# Patient Record
Sex: FEMALE | Race: ASIAN | Employment: FULL TIME | URBAN - METROPOLITAN AREA
[De-identification: names, ages, dates, MRNs, and addresses within clinical notes are randomized per-mention and may not be internally consistent; named-entity substitution may affect disease eponyms.]

---

## 2020-07-05 ENCOUNTER — HOSPITAL ENCOUNTER (EMERGENCY)
Facility: CLINIC | Age: 25
Discharge: HOME | End: 2020-07-05
Attending: EMERGENCY MEDICINE | Admitting: EMERGENCY MEDICINE
Payer: COMMERCIAL

## 2020-07-05 ENCOUNTER — ANCILLARY PROCEDURE (OUTPATIENT)
Dept: ULTRASOUND IMAGING | Facility: CLINIC | Age: 25
End: 2020-07-05
Attending: EMERGENCY MEDICINE
Payer: COMMERCIAL

## 2020-07-05 VITALS
DIASTOLIC BLOOD PRESSURE: 84 MMHG | SYSTOLIC BLOOD PRESSURE: 121 MMHG | RESPIRATION RATE: 18 BRPM | OXYGEN SATURATION: 97 % | HEIGHT: 59 IN | HEART RATE: 63 BPM | WEIGHT: 120 LBS | BODY MASS INDEX: 24.19 KG/M2 | TEMPERATURE: 98 F

## 2020-07-05 DIAGNOSIS — L03.115 CELLULITIS OF RIGHT LOWER EXTREMITY: ICD-10-CM

## 2020-07-05 PROCEDURE — 99284 EMERGENCY DEPT VISIT MOD MDM: CPT | Mod: 25 | Performed by: EMERGENCY MEDICINE

## 2020-07-05 PROCEDURE — 76882 US LMTD JT/FCL EVL NVASC XTR: CPT | Performed by: EMERGENCY MEDICINE

## 2020-07-05 PROCEDURE — 76882 US LMTD JT/FCL EVL NVASC XTR: CPT | Mod: RT | Performed by: EMERGENCY MEDICINE

## 2020-07-05 RX ORDER — CETIRIZINE HYDROCHLORIDE 10 MG/1
10 TABLET ORAL DAILY
COMMUNITY

## 2020-07-05 RX ORDER — DOXYCYCLINE 100 MG/1
100 CAPSULE ORAL 2 TIMES DAILY
Qty: 10 CAPSULE | Refills: 0 | Status: SHIPPED | OUTPATIENT
Start: 2020-07-05 | End: 2020-07-10

## 2020-07-05 RX ORDER — OXYCODONE HYDROCHLORIDE 5 MG/1
5 TABLET ORAL EVERY 6 HOURS PRN
Qty: 12 TABLET | Refills: 0 | COMMUNITY
Start: 2020-07-05 | End: 2020-07-05

## 2020-07-05 ASSESSMENT — MIFFLIN-ST. JEOR: SCORE: 1204.95

## 2020-07-05 NOTE — ED PROVIDER NOTES
"ED Provider Note  [facility]      History     Chief Complaint   Patient presents with     Leg Pain     HPI  Fatmata Ramirez is a 25 year old female who presents for concern of redness and pain weighted area over her right anterior lateral thigh.  Noticed this earlier today called the nurse line and advised to come to the emergency department.  Experience no identifiable area of trauma or bite.  Concerned that this may be a clot.  No history of prior DVTs.  No history of prior incidences.    Otherwise well no fevers or chills no body aches no other rashes  No known sick contacts or covert exposures    Past Medical History  Past Medical History:   Diagnosis Date     Hearing deficit      Past Surgical History:   Procedure Laterality Date     ENT SURGERY      right eye     ORTHOPEDIC SURGERY      right hand     cetirizine (ZYRTEC) 10 MG tablet  doxycycline hyclate (VIBRAMYCIN) 100 MG capsule      Allergies   Allergen Reactions     Pollen Extract Other (See Comments)     Amoxicillin Other (See Comments)     convulsion     Past medical history, past surgical history, medications, and allergies were reviewed with the patient. Additional pertinent items: None    Family History  No family history on file.  Family history was reviewed with the patient. Additional pertinent items: None    Social History  Social History     Tobacco Use     Smoking status: Never Smoker   Substance Use Topics     Alcohol use: Yes     Comment: occasionally     Drug use: Yes     Types: Marijuana     Comment: last use 2 days ago, occasional      Social history was reviewed with the patient. Additional pertinent items: None    Review of Systems   10 point review of symptoms was performed and is negative except as noted above.       Physical Exam   BP: 121/84  Pulse: 63  Temp: 98  F (36.7  C)  Resp: 18  Height: 149.9 cm (4' 11\")  Weight: 54.4 kg (120 lb)  SpO2: 97 %  Physical Exam  GEN: Well appearing, non toxic, cooperative and " conversant.   HEENT: The head is normocephalic and atraumatic. Pupils are equal round and reactive to light. Extraocular motions are intact. There is no facial swelling.   CV: Regular rate   PULM: Unlabored breathing     EXT: Full range of motion.  No edema.  NEURO: Cranial nerves II through XII are intact and symmetric. Bilateral upper and lower extremities grossly show full range of motion without any focal deficits.   Right thigh with quarter sized area of erythema, macular no identifiable vesicle or furuncle, no ulceration.  Minimally erythematous.  Tender to touch  PSYCH: Calm and cooperative, interactive.     ED Course      Procedures  Results for orders placed during the hospital encounter of 07/05/20   POC US SOFT TISSUE    Impression Limited Soft Tissue Ultrasound, performed and interpreted by me.    Indication:  Skin redness pain. Evaluate for cellulitis vs abscess.     Body location: right lower extremity    Findings:  There is no cobblestoning suggestive of cellulitis in the evaluated area. There is no fluid collection to suggest abscess. No foreign body identified    IMPRESSION: No cellulitis or abscess clearly defined.                    Results for orders placed or performed during the hospital encounter of 07/05/20   POC US SOFT TISSUE     Status: None    Impression    Limited Soft Tissue Ultrasound, performed and interpreted by me.    Indication:  Skin redness pain. Evaluate for cellulitis vs abscess.     Body location: right lower extremity    Findings:  There is no cobblestoning suggestive of cellulitis in the evaluated area. There is no fluid collection to suggest abscess. No foreign body identified    IMPRESSION: No cellulitis or abscess clearly defined.            Medications - No data to display     Assessments & Plan (with Medical Decision Making)   25-year-old female presenting with 1 day of painful erythematous patch on her right thigh.  No history of trauma.  No history of insect bite.   Location and symptomatology not consistent with DVT.  Does not appear to be a purpura.  May be related to cellulitis, early.  No underlying abscess on ultrasound and no clear cobblestoning to suggest severe cellulitis.  Given her presentation, however, would be reasonable to treat for early cellulitis.  Prescribe doxycycline due to allergy.  Refer to primary care for follow-up.  Patient agrees with our plan.  Strict ED return precautions given and discussed    - Patient agrees to our plan and is ready and eager for discharge. Care plan, follow up plan, and reasons to return immediately to the ED were dicussed in detail and summarized as noted in the discharge instructions.      I have reviewed the nursing notes. I have reviewed the findings, diagnosis, plan and need for follow up with the patient.    New Prescriptions    DOXYCYCLINE HYCLATE (VIBRAMYCIN) 100 MG CAPSULE    Take 1 capsule (100 mg) by mouth 2 times daily for 5 days       Final diagnoses:   Cellulitis of right lower extremity       --  Lai Pereira MD   Emergency Medicine   [facility], [facility] EMERGENCY DEPARTMENT  7/5/2020     Lai Pereira MD  07/05/20 0436

## 2020-07-05 NOTE — ED TRIAGE NOTES
Pt arrived to the ER with c/o pain in her right thigh, with redness and tender to touch. VSS and afebrile. Pt is concerned about clot in her leg. No known hx of clot.

## 2020-07-05 NOTE — ED AVS SNAPSHOT
[facility], [place], Emergency Department  [address] [place] [id number]  Phone:  [phone]                                    Fatmata Ramirez   MRN: [id number]    Department:  [facility], Emergency Department   Date of Visit:  7/5/2020           After Visit Summary Signature Page    I have received my discharge instructions, and my questions have been answered. I have discussed any challenges I see with this plan with the nurse or doctor.    ..........................................................................................................................................  Patient/Patient Representative Signature      ..........................................................................................................................................  Patient Representative Print Name and Relationship to Patient    ..................................................               ................................................  Date                                   Time    ..........................................................................................................................................  Reviewed by Signature/Title    ...................................................              ..............................................  Date                                               Time          22EPIC Rev 08/18

## 2020-07-05 NOTE — DISCHARGE INSTRUCTIONS
Please make an appointment to [facility] with [facility] (phone: [phone]) in 3-5 days unless symptoms completely resolve.    Return to the emergency department for any worsening redness, swelling, drainage, pain, fever or any other concerns as given or discussed.

## 2021-08-31 ENCOUNTER — OFFICE VISIT (OUTPATIENT)
Dept: OPTOMETRY | Facility: CLINIC | Age: 26
End: 2021-08-31
Payer: COMMERCIAL

## 2021-08-31 DIAGNOSIS — H53.031 STRABISMIC AMBLYOPIA OF RIGHT EYE: ICD-10-CM

## 2021-08-31 DIAGNOSIS — H52.13 MYOPIA OF BOTH EYES WITH ASTIGMATISM: Primary | ICD-10-CM

## 2021-08-31 DIAGNOSIS — H33.101 RETINOSCHISIS OF RIGHT EYE: ICD-10-CM

## 2021-08-31 DIAGNOSIS — H52.203 MYOPIA OF BOTH EYES WITH ASTIGMATISM: Primary | ICD-10-CM

## 2021-08-31 RX ORDER — ESCITALOPRAM OXALATE 10 MG/1
10 TABLET ORAL DAILY
COMMUNITY
Start: 2021-08-17 | End: 2022-12-05

## 2021-08-31 RX ORDER — TRAZODONE HYDROCHLORIDE 50 MG/1
TABLET, FILM COATED ORAL
COMMUNITY
Start: 2021-08-17 | End: 2022-12-05

## 2021-08-31 ASSESSMENT — REFRACTION_WEARINGRX
OD_AXIS: 075
OD_SPHERE: -1.50
OS_AXIS: 110
OD_CYLINDER: +2.50
OS_CYLINDER: +1.75
OS_SPHERE: -0.25

## 2021-08-31 ASSESSMENT — REFRACTION_CURRENTRX
OS_SPHERE: +1.50
OD_CYLINDER: -1.75
OD_BRAND: BF TORIC TRIAL
OS_AXIS: 010
OD_SPHERE: +1.00
OD_AXIS: 170
OS_BRAND: BF TORIC TRIAL
OS_CYLINDER: -1.75

## 2021-08-31 ASSESSMENT — CUP TO DISC RATIO
OS_RATIO: 0.25
OD_RATIO: 0.25

## 2021-08-31 ASSESSMENT — TONOMETRY
OS_IOP_MMHG: 17
IOP_METHOD: ICARE
OD_IOP_MMHG: 17

## 2021-08-31 ASSESSMENT — REFRACTION_MANIFEST
OS_AXIS: 105
OS_SPHERE: -0.25
OS_CYLINDER: +1.75
OD_SPHERE: -1.50
OD_CYLINDER: +2.75
OD_AXIS: 078

## 2021-08-31 ASSESSMENT — CONF VISUAL FIELD
OD_NORMAL: 1
OS_NORMAL: 1

## 2021-08-31 ASSESSMENT — SLIT LAMP EXAM - LIDS
COMMENTS: NORMAL
COMMENTS: NORMAL

## 2021-08-31 ASSESSMENT — EXTERNAL EXAM - RIGHT EYE: OD_EXAM: NORMAL

## 2021-08-31 ASSESSMENT — VISUAL ACUITY
OS_CC: 20/25-2
OD_CC: 20/30+1
CORRECTION_TYPE: GLASSES
METHOD: SNELLEN - LINEAR

## 2021-08-31 ASSESSMENT — EXTERNAL EXAM - LEFT EYE: OS_EXAM: NORMAL

## 2021-08-31 NOTE — PROGRESS NOTES
A/P  1.) Mixed Astigmatism each eye  -Largely stable spec Rx, updated today  -Previous CL wearer, would like part-time option. 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 Monitor 1 year routine eye exam

## 2021-09-07 ENCOUNTER — TELEPHONE (OUTPATIENT)
Dept: OPTOMETRY | Facility: CLINIC | Age: 26
End: 2021-09-07

## 2021-09-07 NOTE — TELEPHONE ENCOUNTER
----- Message from Es Infante, OD sent at 9/7/2021  8:05 AM CDT -----  Could you let pt know her contact lens trials are just a little bit delayed? I have the left lenses in but the right are due to come in this week. We will still mail when both eyes are here.    Thanks

## 2021-11-18 NOTE — TELEPHONE ENCOUNTER
RECORDS RECEIVED FROM: Right hand/ hx surgeries/ increased pain wrist/ decrease ROM/ self/ HP   DATE RECEIVED: Dec 14, 2021     NOTES STATUS DETAILS   OFFICE NOTE from referring provider Care Everywhere Lisa Graff MD     DISCHARGE REPORT from the ER Care Everywhere Jessica Dueñas PA     OPERATIVE REPORT In process    MEDICATION LIST Internal    XRAYS (IMAGES & REPORTS) In process-R      12.14.21 MELLO  Called [facility]. Stated was only seen the one time in 2016.    12.14.21 MJ  Called [facility]. They open at 8:30 AM [facility].    12.13.21    Received note from 8.11.16 from [facility]. Sent to urgent scanning. Sent fax again asking for ALL related medical records. OV states pt seen back in 2000.      12.13.21 MELLO  Called [phone]. Stated they haven't received anything. Requested for another fax to be sent. Sent stat request to verified fax number at [phone]    12/08/21   10:14 AM   3rd request sent to [facility]. Called facility and was sent in a loop unable to speak to anyone  Jennifer Sadler CMA      12/07/21   9:21 AM   CALLED [facility], IMAGES ARE AT THE [facility] IN [place], FAXED REQUEST [phone]  2ND REQUEST SENT TO KIRK Sadler CMA    11/30/21   11:23 AM   CALLED PATIENT TO SEE WHERE SURGERIES WERE. [facility]. [phone] FAXED REQUEST AND ALSO ASKED PATIENT TO REACH OUT TO FACILITY TO SIGN DOMINIQUE  Jennifer Sadler CMA

## 2021-11-19 ENCOUNTER — THERAPY VISIT (OUTPATIENT)
Dept: OCCUPATIONAL THERAPY | Facility: CLINIC | Age: 26
End: 2021-11-19
Payer: COMMERCIAL

## 2021-11-19 DIAGNOSIS — M65.4 DE QUERVAIN'S DISEASE (TENOSYNOVITIS): ICD-10-CM

## 2021-11-19 PROCEDURE — 97110 THERAPEUTIC EXERCISES: CPT | Mod: GO

## 2021-11-19 PROCEDURE — 97165 OT EVAL LOW COMPLEX 30 MIN: CPT | Mod: GO

## 2021-11-19 PROCEDURE — 97760 ORTHOTIC MGMT&TRAING 1ST ENC: CPT | Mod: GO

## 2021-11-19 NOTE — PROGRESS NOTES
[facility] Initial Evaluation     Current Date: 11/19/2021    Diagnosis: R de Quervain   DOI: 9/1/2021    Subjective:  Answers for HPI/ROS submitted by the patient on 11/19/2021  Reason for Visit:: Dequervains synovitis in my right hand and childhood trauma to my right thumb.  When problem began:: 5/19/1998  How problem occurred:: Overuse due to compensating for limited mobility and surgery complications.  Number scale: 5/10  General health as reported by patient: good  Please check all that apply to your current or past medical history: implanted device, migraines/headaches, numbness/tingling  Medical allergies: other  Other Allergies Detail: Amoxicillin  Surgeries: orthopedic surgery, other  Other Surgery Detail: Eye surgery  Medications you are currently taking: anti-depressants, pain medication, sleep medication  Occupation::  - [employer]  What are your primary job tasks: computer work, driving, prolonged sitting, repetitive tasks    Occupational Profile Information:  Right hand dominant  Prior functional level:  independent-shared household chores  Patient reports symptoms of pain, stiffness/loss of motion, weakness/loss of strength, edema, numbness and tingling   Special tests:  x-ray.    Previous treatment: otc brace, icing, NSAIDS, CBD oil  Barriers include:none  Mobility: No difficulty  Transportation: drives  Leisure activities/hobbies: cooking/baking, play guitar, watching television    Functional Outcome Measure:   Upper Extremity Functional Index Score:  SCORE:   Column Totals: /80: (P) 40   (A lower score indicates greater disability.)    Objective:  Pain Level (Scale 0-10):   11/19/2021   At Rest 4-5/10   With Use 7/10     Pain Description:  Date 11/19/2021   Location Thumb in 1st dc, shooting pain through thumb and sometimes on pinky side stemming from elbow, thenar muscle is sore.    Pain Quality Aching, Burning, Dull, Numb, Sharp, Shooting and Tingling   Frequency constant     Pain  is worst  daytime or nighttime   Exacerbated by  driving, cooking (gripping and stirring), weightbearing, opening jars, and using keys   Relieved by NSAIDs and CBD oil, otc brace   Progression Gradually worsening     Sensation   Decreased Radial Nerve distribution per pt report    Edema   - none  + mild    ++ moderate    +++ severe    11/19/2021   1st DC +   Radial Styloid +      ROM  Pain Report: - none  + mild    ++ moderate    +++ severe   Thumb 11/19/2021 11/19/2021   AROM (PROM) L R   MP 0/50 0/36   IP 0/74 0/77   RABD 44 39   PABD 41 34   Opposition 10 8     Wrist 11/19/2021 11/19/2021   AROM (PROM) L R   Extension 64 63   Flexion 66 51   RD 4 3   UD 31 24   UD with Th Flex 23 10     Resisted Testing  Pain Report: - none  + mild    ++ moderate    +++ severe    11/19/2021   APL 4/5, 6/10   EPB 5/5, 6/10   EPL 4+/5, 5/10   FCR 4+/5, 7/10     Strength   (Measured in pounds)  Pain Report: - none  + mild    ++ moderate    +++ severe    11/19/2021 11/19/2021   Trials L R   1  2  3 54 16 (5/10 pain)   Average 54 16     Lat Pinch 11/19/2021 11/19/2021   Trials L R   1  2  3 19 9 (5/10 pain)   Average 19 9     3 Pt Pinch 11/19/2021 11/19/2021   Trials L R   1  2  3 16 7 (5/10 pain)   Average 16 7     Special Tests   Pain Report:  - none    + mild    ++ moderate    +++ severe    11/19/2021   Finkelsteins + (6/10 pain)   Radial Nerve Tinel's (DRSN) + (8/10 pain)   Th CMC Grind -     Neural Tension Testing  RNT: Radial Neurodynamic Test (based on KADIE Higginbotham's ULNT)   11/19/2021   0-5 Scale 1/5   Position:   0/5: Arm across abdomen in coronal plane  1/5: Depress shoulder, ER to neutral ABD shoulder to 45 degrees  2/5: IR shoulder to end range, keep elbow at 90 degrees  3/5: Extend elbow to 0 degrees  4/5: Fully pronate forearm  5/5: Flex wrist and fingers with UD  Notes:    (+) indicates beyond grade level but less than senior care to next level    (-) indicates over senior care to level    S1  onset/change of patient's  symptoms    S2 definite stop point based on patient's discomfort level    Neural Tension Testing  UNT: Ulnar Neurodynamic Test (based on DS Anahy's ULNT)   11/19/2021   0-5 Scale 1/5   Position:   0/5: Arm across abdomen in coronal plane  1/5: ER to neutral ABD shoulder to 45 degrees  2/5: ER shoulder to 90 degrees  3/5: Block shoulder and ABD shoulder to 110 degrees  4/5: Fully pronate forearm, extend wrist and ring and small fingers  5/5: Flex elbow and bring hand to side of face  Notes:    (+) indicates beyond grade level but less than detention to next level    (-) indicates over detention to level    S1  onset/change of patient's symptoms    S2 definite stop point based on patient's discomfort level    Palpation  Pain Report:  - none    + mild    ++ moderate    +++ severe    11/19/2021   Radial Styloid 8/10   1st DC 6/10   FCR 6/10   Thumb CMC 5-6/10   PIN Site 7/10   Extensor Wad 6/10     Assessment:  Patient presents with symptoms consistent with diagnosis of right de Quervain, with conservative intervention.     Patient's limitations or Problem List includes:  Pain, Decreased ROM/motion, Increased edema, Weakness, Sensory disturbance and Tightness in musculature of the right wrist which interferes with the patient's ability to perform Self Care Tasks (dressing, hygiene/toileting), Work Tasks, Sleep Patterns, Recreational Activities, Household Chores and Driving  as compared to previous level of function.    Rehab Potential:  Good - Return to full activity, some limitations    Patient will benefit from skilled Occupational Therapy to increase ROM, flexibility, motion, overall strength,  strength, pinch strength and sensation and decrease pain and edema to return to previous activity level and resume normal daily tasks and to reach their rehab potential.    Barriers to Learning:  No barrier    Communication Issues:  Patient appears to be able to clearly communicate and understand verbal and written  communication and follow directions correctly.    Chart Review: Chart Review and Simple history review with patient    Identified Performance Deficits: bathing/showering, dressing, hygiene and grooming, driving and community mobility, health management and maintenance, home establishment and management, meal preparation and cleanup, shopping, sleep, school and leisure activities    Assessment of Occupational Performance:  5 or more Performance Deficits    Clinical Decision Making (Complexity): Low complexity    Treatment Explanation:  The following has been discussed with the patient:  RX ordered/plan of care  Anticipated outcomes  Possible risks and side effects    Plan:  Frequency:  1 X week, once daily  Duration:  for 8 weeks    Treatment Plan:    Modalities:    US, Fluidotherapy and Paraffin   Therapeutic Exercise:    AROM, AAROM, PROM, Isotonics and Isometrics  Neuromuscular re-ed:   Nerve Gliding, Kinesiotaping and Stabilization  Manual Techniques:   Myofascial release and Manual edema mobilization  Orthotic Fabrication:    Static  Self Care:    Self Care Tasks, Ergonomic Considerations and Work Tasks    Discharge Plan:  Achieve all LTG.  Independent in home treatment program.  Reach maximal therapeutic benefit.    Discharge Plan:    Achieve all LTG.  Independent in home treatment program.  Reach maximal therapeutic benefit.    Home Exercise Program:  deQuervain Overview  EMR Notes  HEP - Sets  Reps  Sessions per day  Notes  Orthosis Wear and Care  EMR Notes  HEP - Sets  Reps  Sessions per day  Notes  Ball Massage  EMR Notes  HEP - Sets  Reps  Sessions per day  Notes  Wrist Active Range of Motion Extension and Flexion Combined  EMR Notes  HEP - Sets  Reps 10  Sessions per day 3-4  Notes  Wrist Active Range of Motion Radial and Ulnar Deviation for deQuervains  EMR Notes  HEP - Sets  Reps 10  Sessions per day 3-4  Thumb Active Range of Motion Composite Flexion  EMR Notes  HEP - Sets  Reps 10  Sessions per day  3-4  Notes  Thumb Active Range of Motion Palmar Abduction  EMR Notes  HEP - Sets  Reps 10  Sessions per day 3-4  Notes  Thumb Active Range of Motion Opposition  EMR Notes  HEP - Sets  Reps 10  Sessions per day 3-4  Wrist Passive Range of Motion DeQuervain's Stretch  EMR Notes  HEP - Sets  Reps 5  Sessions per day 3-4  Notes gentle stretch, hold 10 seconds  Nerve Gliding Proximal Radial  EMR Notes  HEP - Sets  Reps 10  Sessions per day 1-2  Notes Ignore head movements. Hold for 5 seconds where you feel the pull.  Nerve Gliding Proximal Ulnar  EMR Notes  HEP - Sets  Reps 10  Sessions per day 1-2  Notes Ignore head movements. Hold for 5 seconds where you feel the pull.    Next Visit:  Review HEP  Review orthosis fit   MFR  Nerve gliding

## 2021-11-19 NOTE — PROGRESS NOTES
[facility]    OUTPATIENT Occupational Therapy ORTHOPEDIC EVALUATION  PLAN OF TREATMENT FOR OUTPATIENT REHABILITATION  (COMPLETE FOR INITIAL CLAIMS ONLY)  Patient's Last Name, First Name, M.I.  YOB: 1995  Fatmata Ramirez    Provider s Name:  [facility]   Medical Record No.  [id number]   Start of Care Date:  11/19/21   Onset Date:   09/01/21   Type:     ___PT   __X_OT Medical Diagnosis:    Encounter Diagnosis   Name Primary?     De Quervain's disease (tenosynovitis)         Treatment Diagnosis:  R de Quervain         Goals:     11/19/21 0500   Goal #1   Goal #1 household chores   Previous Performance Level Independent   Current Functional Task    Current Performance Level 7/10 pain   STG Target Perfomance Open a tight or new jar   STG Target Perform Level 5/10 pain   Due Date 12/31/21   LTG Target Task/Performance Pain free household chores   Due Date 01/14/22       Therapy Frequency:  1x weekly   Predicted Duration of Therapy Intervention:  8 weeks    Alexandra Ttaum OT                 I CERTIFY THE NEED FOR THESE SERVICES FURNISHED UNDER        THIS PLAN OF TREATMENT AND WHILE UNDER MY CARE .             Physician Signature               Date    X_____________________________________________________                             Certification Date From:  11/19/21   Certification Date To:  02/16/22    Referring Provider:  Referred Self    Initial Assessment        See Epic Evaluation SOC Date: 11/19/21

## 2021-11-30 ENCOUNTER — THERAPY VISIT (OUTPATIENT)
Dept: OCCUPATIONAL THERAPY | Facility: CLINIC | Age: 26
End: 2021-11-30
Payer: COMMERCIAL

## 2021-11-30 DIAGNOSIS — M65.4 DE QUERVAIN'S DISEASE (TENOSYNOVITIS): ICD-10-CM

## 2021-11-30 PROCEDURE — 97763 ORTHC/PROSTC MGMT SBSQ ENC: CPT | Mod: GO | Performed by: OCCUPATIONAL THERAPIST

## 2021-11-30 PROCEDURE — 97110 THERAPEUTIC EXERCISES: CPT | Mod: GO | Performed by: OCCUPATIONAL THERAPIST

## 2021-12-05 ENCOUNTER — HEALTH MAINTENANCE LETTER (OUTPATIENT)
Age: 26
End: 2021-12-05

## 2021-12-08 ENCOUNTER — THERAPY VISIT (OUTPATIENT)
Dept: OCCUPATIONAL THERAPY | Facility: CLINIC | Age: 26
End: 2021-12-08
Payer: COMMERCIAL

## 2021-12-08 DIAGNOSIS — M65.4 DE QUERVAIN'S DISEASE (TENOSYNOVITIS): ICD-10-CM

## 2021-12-08 PROCEDURE — 97763 ORTHC/PROSTC MGMT SBSQ ENC: CPT | Mod: GO | Performed by: OCCUPATIONAL THERAPIST

## 2021-12-08 PROCEDURE — 97112 NEUROMUSCULAR REEDUCATION: CPT | Mod: GO | Performed by: OCCUPATIONAL THERAPIST

## 2021-12-08 PROCEDURE — 97535 SELF CARE MNGMENT TRAINING: CPT | Mod: GO | Performed by: OCCUPATIONAL THERAPIST

## 2021-12-14 ENCOUNTER — PRE VISIT (OUTPATIENT)
Dept: ORTHOPEDICS | Facility: CLINIC | Age: 26
End: 2021-12-14

## 2021-12-14 ENCOUNTER — OFFICE VISIT (OUTPATIENT)
Dept: ORTHOPEDICS | Facility: CLINIC | Age: 26
End: 2021-12-14
Payer: COMMERCIAL

## 2021-12-14 DIAGNOSIS — M65.4 DE QUERVAIN'S DISEASE (TENOSYNOVITIS): ICD-10-CM

## 2021-12-14 DIAGNOSIS — M79.644 THUMB PAIN, RIGHT: Primary | ICD-10-CM

## 2021-12-14 PROCEDURE — 99203 OFFICE O/P NEW LOW 30 MIN: CPT | Performed by: STUDENT IN AN ORGANIZED HEALTH CARE EDUCATION/TRAINING PROGRAM

## 2021-12-14 RX ORDER — FLUTICASONE PROPIONATE 50 MCG
SPRAY, SUSPENSION (ML) NASAL
COMMUNITY
Start: 2021-05-05

## 2021-12-14 NOTE — PROGRESS NOTES
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  She was seen by another hand surgeon a month ago and underwent de Quervain's steroid injection with no relief.  She also complains of intermittent dorsal hand tingling. She has been seeing a therapist and using a thumb spica soft neoprene splint.  She seeks a second opinion.    ROS: Negative, see HPI  Past medical history: Depression  Past surgical history: 3 right thumb mass excisions greater than 16 years ago, with the last operation being a nail bed ablation with skin grafting, done by Dr. Farzad Garner  Medications: Lexapro  Allergies: Amoxicillin, doxycycline, hydrocodone, pollen  Social history: Non-smoker, denies any tobacco or nicotine use  Family history: No bleeding or clotting problems, issues with anesthesia    Examination:  Not distressed  Nonlabored breathing  Right thumb with near full range of motion but resting flexed posture of the interphalangeal joint with no nail and well-healed skin graft with no skin contracture  Tenderness over the right radial styloid and first extensor tendons with a positive Finkelstein's test  No tenderness of the right snuffbox or the scaphoid tubercle  No right SL tenderness  No tenderness over the right thumb MP joint or CMC joint with no grind  No right intersection tenderness  Mild right ECU tenderness with no subluxation  Mild tenderness of the right thumb A1 pulley with no trigger  Normal right dorsal radial sensation with no Tinel    A/P: 26-year-old right-hand-dominant non-smoker resenting with right de Quervain's tenosynovitis, right ECU tendinitis, right FPL tendinitis    -Discussed the etiology likely being de Quervain's tenosynovitis with irritation of the radial sensory nerves.  Agree with steroid injection done  by the other hand surgeon.  Discussed options for additional management, including activity modification, continued splinting, rest, anti-inflammatories, stretching exercises and more time.  Offered patient repeat steroid injection in 1 month, and patient is interested and agreeable.  -We will also obtain a right hand XR at the next visit  -Return to clinic in 1 month for reevaluation and likely steroid injection  -A total of 30 minutes was devoted to review of chart, direct face-to-face patient counseling and documentation during this encounter    Irving Kelly MD, PhD

## 2021-12-14 NOTE — NURSING NOTE
Reason For Visit:   Chief Complaint   Patient presents with     Consult For     Right hand and wrist pain       Primary MD: No Ref-Primary, Physician    ?  No    Age: 26 year old    Occupation: Full time grad student.  Currently working? No.  Work status?  Full time.  Type of injury: Congenital defect.  Date of surgery: Most recent in 2005 - 3 previous surgeries  Type of surgery: Corrections of congenital injury/ defect.  Smoker: No  Request smoking cessation information: No      There were no vitals taken for this visit.      Pain Assessment  Patient Currently in Pain: Yes  0-10 Pain Scale: 4  Primary Pain Location: Hand (Right)               QuickDASH Assessment  No flowsheet data found.       Allergies   Allergen Reactions     Pollen Extract Other (See Comments)     Amoxicillin Other (See Comments)     convulsion     Cat Hair Extract Other (See Comments)     Runny nose     Doxycycline Diarrhea and Nausea     Hydrocodone Hives       Susan Banegas, ATC

## 2021-12-14 NOTE — LETTER
12/14/2021         RE: Fatmata Ramirez  [address]  [address]  [place] [address]        Dear Colleague,    Thank you for referring your patient, Fatmata Ramirez, to the [facility] [place]. Please see a copy of my visit note below.    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  She was seen by another hand surgeon a month ago and underwent de Quervain's steroid injection with no relief.  She also complains of intermittent dorsal hand tingling. She has been seeing a therapist and using a thumb spica soft neoprene splint.  She seeks a second opinion.    ROS: Negative, see HPI  Past medical history: Depression  Past surgical history: 3 right thumb mass excisions greater than 16 years ago, with the last operation being a nail bed ablation with skin grafting, done by Dr. Farzad Garner  Medications: Lexapro  Allergies: Amoxicillin, doxycycline, hydrocodone, pollen  Social history: Non-smoker, denies any tobacco or nicotine use  Family history: No bleeding or clotting problems, issues with anesthesia    Examination:  Not distressed  Nonlabored breathing  Right thumb with near full range of motion but resting flexed posture of the interphalangeal joint with no nail and well-healed skin graft with no skin contracture  Tenderness over the right radial styloid and first extensor tendons with a positive Finkelstein's test  No tenderness of the right snuffbox or the scaphoid tubercle  No right SL tenderness  No tenderness over the right thumb MP joint or CMC joint with no grind  No right intersection tenderness  Mild right ECU tenderness with no subluxation  Mild tenderness of the right thumb A1 pulley with no trigger  Normal right dorsal radial sensation with no Tinel    A/P: 26-year-old  right-hand-dominant non-smoker resenting with right de Quervain's tenosynovitis, right ECU tendinitis, right FPL tendinitis    -Discussed the etiology likely being de Quervain's tenosynovitis with irritation of the radial sensory nerves.  Agree with steroid injection done by the other hand surgeon.  Discussed options for additional management, including activity modification, continued splinting, rest, anti-inflammatories, stretching exercises and more time.  Offered patient repeat steroid injection in 1 month, and patient is interested and agreeable.  -We will also obtain a right hand XR at the next visit  -Return to clinic in 1 month for reevaluation and likely steroid injection  -A total of 30 minutes was devoted to review of chart, direct face-to-face patient counseling and documentation during this encounter    Irving Kelly MD, PhD

## 2021-12-17 ENCOUNTER — THERAPY VISIT (OUTPATIENT)
Dept: OCCUPATIONAL THERAPY | Facility: CLINIC | Age: 26
End: 2021-12-17
Payer: COMMERCIAL

## 2021-12-17 DIAGNOSIS — M65.4 DE QUERVAIN'S DISEASE (TENOSYNOVITIS): ICD-10-CM

## 2021-12-17 PROCEDURE — 97112 NEUROMUSCULAR REEDUCATION: CPT | Mod: GO | Performed by: OCCUPATIONAL THERAPIST

## 2021-12-17 PROCEDURE — 97110 THERAPEUTIC EXERCISES: CPT | Mod: GO | Performed by: OCCUPATIONAL THERAPIST

## 2022-01-06 DIAGNOSIS — M65.4 DE QUERVAIN'S DISEASE (TENOSYNOVITIS): Primary | ICD-10-CM

## 2022-02-01 ENCOUNTER — THERAPY VISIT (OUTPATIENT)
Dept: OCCUPATIONAL THERAPY | Facility: CLINIC | Age: 27
End: 2022-02-01
Payer: COMMERCIAL

## 2022-02-01 DIAGNOSIS — M65.4 DE QUERVAIN'S DISEASE (TENOSYNOVITIS): ICD-10-CM

## 2022-02-01 PROCEDURE — 97112 NEUROMUSCULAR REEDUCATION: CPT | Mod: GO | Performed by: OCCUPATIONAL THERAPIST

## 2022-02-01 PROCEDURE — 97535 SELF CARE MNGMENT TRAINING: CPT | Mod: GO | Performed by: OCCUPATIONAL THERAPIST

## 2022-02-08 ENCOUNTER — OFFICE VISIT (OUTPATIENT)
Dept: ORTHOPEDICS | Facility: CLINIC | Age: 27
End: 2022-02-08
Payer: COMMERCIAL

## 2022-02-08 DIAGNOSIS — M65.4 DE QUERVAIN'S DISEASE (TENOSYNOVITIS): Primary | ICD-10-CM

## 2022-02-08 PROCEDURE — 99207 PR DROP WITH A PROCEDURE: CPT | Performed by: STUDENT IN AN ORGANIZED HEALTH CARE EDUCATION/TRAINING PROGRAM

## 2022-02-08 PROCEDURE — 20550 NJX 1 TENDON SHEATH/LIGAMENT: CPT | Mod: RT | Performed by: STUDENT IN AN ORGANIZED HEALTH CARE EDUCATION/TRAINING PROGRAM

## 2022-02-08 RX ORDER — LIDOCAINE HYDROCHLORIDE 10 MG/ML
0.5 INJECTION, SOLUTION EPIDURAL; INFILTRATION; INTRACAUDAL; PERINEURAL
Status: SHIPPED | OUTPATIENT
Start: 2022-02-08

## 2022-02-08 RX ORDER — TRIAMCINOLONE ACETONIDE 40 MG/ML
20 INJECTION, SUSPENSION INTRA-ARTICULAR; INTRAMUSCULAR
Status: SHIPPED | OUTPATIENT
Start: 2022-02-08

## 2022-02-08 RX ADMIN — TRIAMCINOLONE ACETONIDE 20 MG: 40 INJECTION, SUSPENSION INTRA-ARTICULAR; INTRAMUSCULAR at 08:38

## 2022-02-08 RX ADMIN — LIDOCAINE HYDROCHLORIDE 0.5 ML: 10 INJECTION, SOLUTION EPIDURAL; INFILTRATION; INTRACAUDAL; PERINEURAL at 08:38

## 2022-02-08 NOTE — LETTER
Date:February 8, 2022      Patient was self referred, no letter generated. Do not send.        [facility] Health Information

## 2022-02-08 NOTE — PROGRESS NOTES
Ortho Hand    Patient has right recurrent radial wrist pain.  Has been wearing splints.  Has been continuing stretching exercises.  Last steroid injection was 3 months ago.  Complains of occasional tightness in the right first dorsal interossei muscle and occasional tingling of the right small finger.  Has not done splinting or padding.    On exam, tenderness over the radial styloid with positive Finkelstein's test.  Also, with normal intrinsic strength of the right hand.  Positive Tinel's at the right elbow with positive elbow flexion test.    A/P: 26-year-old presenting with recurrent right de Quervain's tenosynovitis as well as a clinically-irritated right ulnar nerve at the elbow    -Discussed the options for management of the right de Quervain's tenosynovitis, including continued splinting, stretching, steroid injection, and surgery.  At this point, my recommendation is a trial of a repeat steroid injection.  Patient is agreeable.  Discussed the risks of a steroid injection, including but not limited to: Infection, pain, injury to nerve and tendon, skin depigmentation, fat atrophy.  Despite these risks, patient consents to and would like to proceed with a right radial sided wrist steroid injection for de Quervain's.  Mixed 1:1 1% lidocaine and 20 mg of Kenalog.  Cleansed the skin with alcohol, and injected into the first extensor compartment over the radial styloid.  Patient tolerated the procedure well.  -We will provide an elbow pad  -Return to clinic in 6 weeks for reevaluation    Irving Kelly MD, PhD

## 2022-02-08 NOTE — LETTER
2022         RE: Fatmata Ramirez  [address]  [address]  [place] [address]        Dear Colleague,    Thank you for referring your patient, Fatmata Ramirez, to the [facility]. Please see a copy of my visit note below.    Hand / Upper Extremity Injection/Arthrocentesis: R extensor compartment 1    Date/Time: 2022 8:38 AM  Performed by: Irving Kelly MD  Authorized by: Irving Kelly MD     Needle Size:  27 G  Guidance: landmark    Condition: de Quervain's      Site:  R extensor compartment 1  Medications:  20 mg triamcinolone 40 MG/ML; 0.5 mL lidocaine (PF) 1 %  Outcome:  Tolerated well, no immediate complications  Procedure discussed: discussed risks, benefits, and alternatives    Consent Given by:  Patient  Timeout: timeout called immediately prior to procedure    Prep: patient was prepped and draped in usual sterile fashion        [facility] [address] [id number] [phone]  Dept: [phone]  ______________________________________________________________________________    Patient: Fatmata Ramirez   : 1995   MRN: [id number]   2022    INVASIVE PROCEDURE SAFETY CHECKLIST    Date: 2022   Procedure: Right wrist cortisone injection - dequervain's  Patient Name: Fatmata Ramirez  MRN: [id number]  YOB: 1995    Action: Complete sections as appropriate. Any discrepancy results in a HARD COPY until resolved.     PRE PROCEDURE:  Patient ID verified with 2 identifiers (name and  or MRN): Yes  Procedure and site verified with patient/designee (when able): Yes  Accurate consent documentation in medical record: Yes  H&P (or appropriate assessment) documented in medical record: Yes  H&P must be up to 20 days prior to procedure and updates within 24 hours of procedure as applicable: Yes  Relevant diagnostic and radiology test results appropriately labeled  and displayed as applicable: Yes  Procedure site(s) marked with provider initials: Yes    TIMEOUT:  Time-Out performed immediately prior to starting procedure, including verbal and active participation of all team members addressing the following:Yes  * Correct patient identify  * Confirmed that the correct side and site are marked  * An accurate procedure consent form  * Agreement on the procedure to be done  * Correct patient position  * Relevant images and results are properly labeled and appropriately displayed  * The need to administer antibiotics or fluids for irrigation purposes during the procedure as applicable   * Safety precautions based on patient history or medication use    DURING PROCEDURE: Verification of correct person, site, and procedures any time the responsibility for care of the patient is transferred to another member of the care team.       The following medications were given:         Prior to injection, verified patient identity using patient's name and date of birth.  Due to injection administration, patient instructed to remain in clinic for 15 minutes  afterwards, and to report any adverse reaction to me immediately.    Right extensor compartment 1 injection was performed  Medication Name: Kenalog NDC: [address]-1  Drug Amount Wasted:  Yes: 0.5 mg/ml   Vial/Syringe: Single dose vial  Expiration Date:  10/1/23    Medication Name: Lidocaine NDC: 80347-044-55    Scribed by Susan Banegas ATC for Dr. Kelly on February 8, 2022 at 8:45 am based on the provider's statements to me.     Susan Banegas ATC          Ortho Hand    Patient has right recurrent radial wrist pain.  Has been wearing splints.  Has been continuing stretching exercises.  Last steroid injection was 3 months ago.  Complains of occasional tightness in the right first dorsal interossei muscle and occasional tingling of the right small finger.  Has not done splinting or padding.    On exam, tenderness over the radial  styloid with positive Finkelstein's test.  Also, with normal intrinsic strength of the right hand.  Positive Tinel's at the right elbow with positive elbow flexion test.    A/P: 26-year-old presenting with recurrent right de Quervain's tenosynovitis as well as a clinically-irritated right ulnar nerve at the elbow    -Discussed the options for management of the right de Quervain's tenosynovitis, including continued splinting, stretching, steroid injection, and surgery.  At this point, my recommendation is a trial of a repeat steroid injection.  Patient is agreeable.  Discussed the risks of a steroid injection, including but not limited to: Infection, pain, injury to nerve and tendon, skin depigmentation, fat atrophy.  Despite these risks, patient consents to and would like to proceed with a right radial sided wrist steroid injection for de Quervain's.  Mixed 1:1 1% lidocaine and 20 mg of Kenalog.  Cleansed the skin with alcohol, and injected into the first extensor compartment over the radial styloid.  Patient tolerated the procedure well.  -We will provide an elbow pad  -Return to clinic in 6 weeks for reevaluation    Irving Kelly MD, PhD      Again, thank you for allowing me to participate in the care of your patient.        Sincerely,        Irving Kelly MD

## 2022-02-08 NOTE — PROGRESS NOTES
Hand / Upper Extremity Injection/Arthrocentesis: R extensor compartment 1    Date/Time: 2022 8:38 AM  Performed by: Irving Kelly MD  Authorized by: Irving Kelly MD     Needle Size:  27 G  Guidance: landmark    Condition: de Quervain's      Site:  R extensor compartment 1  Medications:  20 mg triamcinolone 40 MG/ML; 0.5 mL lidocaine (PF) 1 %  Outcome:  Tolerated well, no immediate complications  Procedure discussed: discussed risks, benefits, and alternatives    Consent Given by:  Patient  Timeout: timeout called immediately prior to procedure    Prep: patient was prepped and draped in usual sterile fashion        [facility] [address]  [address] [facility] [id number] [phone]  Dept: [phone]  ______________________________________________________________________________    Patient: Fatmata Ramirez   : 1995   MRN: [id number]   2022    INVASIVE PROCEDURE SAFETY CHECKLIST    Date: 2022   Procedure: Right wrist cortisone injection - dequervain's  Patient Name: Fatmata Ramirez  MRN: [id number]  YOB: 1995    Action: Complete sections as appropriate. Any discrepancy results in a HARD COPY until resolved.     PRE PROCEDURE:  Patient ID verified with 2 identifiers (name and  or MRN): Yes  Procedure and site verified with patient/designee (when able): Yes  Accurate consent documentation in medical record: Yes  H&P (or appropriate assessment) documented in medical record: Yes  H&P must be up to 20 days prior to procedure and updates within 24 hours of procedure as applicable: Yes  Relevant diagnostic and radiology test results appropriately labeled and displayed as applicable: Yes  Procedure site(s) marked with provider initials: Yes    TIMEOUT:  Time-Out performed immediately prior to starting procedure, including verbal and active participation of all team members addressing the following:Yes  * Correct patient  identify  * Confirmed that the correct side and site are marked  * An accurate procedure consent form  * Agreement on the procedure to be done  * Correct patient position  * Relevant images and results are properly labeled and appropriately displayed  * The need to administer antibiotics or fluids for irrigation purposes during the procedure as applicable   * Safety precautions based on patient history or medication use    DURING PROCEDURE: Verification of correct person, site, and procedures any time the responsibility for care of the patient is transferred to another member of the care team.       The following medications were given:         Prior to injection, verified patient identity using patient's name and date of birth.  Due to injection administration, patient instructed to remain in clinic for 15 minutes  afterwards, and to report any adverse reaction to me immediately.    Right extensor compartment 1 injection was performed  Medication Name: Kenalog NDC: [address]-9  Drug Amount Wasted:  Yes: 0.5 mg/ml   Vial/Syringe: Single dose vial  Expiration Date:  10/1/23    Medication Name: Lidocaine NDC: 14231-927-39    Scribed by Susan Banegas ATC for Dr. Kelly on February 8, 2022 at 8:45 am based on the provider's statements to me.     Susan Banegas ATC

## 2022-02-08 NOTE — NURSING NOTE
Reason For Visit:   Chief Complaint   Patient presents with     RECHECK     Right wrist dequervain's follow up - injection       Primary MD: No Ref-Primary, Physician    Age: 26 year old    ?  No      There were no vitals taken for this visit.      Pain Assessment  Patient Currently in Pain: Yes  0-10 Pain Scale: 4  Primary Pain Location: Wrist (Right)               QuickDASH Assessment  No flowsheet data found.       Current Outpatient Medications   Medication Sig Dispense Refill     cetirizine (ZYRTEC) 10 MG tablet Take 10 mg by mouth daily       escitalopram (LEXAPRO) 10 MG tablet Take 10 mg by mouth daily       fluticasone (FLONASE) 50 MCG/ACT nasal spray        traZODone (DESYREL) 50 MG tablet TAKE 1 TABLET BY MOUTH EVERYDAY AT BEDTIME         Allergies   Allergen Reactions     Pollen Extract Other (See Comments)     Amoxicillin Other (See Comments)     convulsion     Cat Hair Extract Other (See Comments)     Runny nose     Doxycycline Diarrhea and Nausea     Hydrocodone Hives       Susan Banegas, ATC

## 2022-02-11 ENCOUNTER — THERAPY VISIT (OUTPATIENT)
Dept: OCCUPATIONAL THERAPY | Facility: CLINIC | Age: 27
End: 2022-02-11
Payer: COMMERCIAL

## 2022-02-11 DIAGNOSIS — M65.4 DE QUERVAIN'S DISEASE (TENOSYNOVITIS): ICD-10-CM

## 2022-02-11 PROCEDURE — 97763 ORTHC/PROSTC MGMT SBSQ ENC: CPT | Mod: GO | Performed by: OCCUPATIONAL THERAPIST

## 2022-02-11 PROCEDURE — 97110 THERAPEUTIC EXERCISES: CPT | Mod: GO | Performed by: OCCUPATIONAL THERAPIST

## 2022-02-18 ENCOUNTER — THERAPY VISIT (OUTPATIENT)
Dept: OCCUPATIONAL THERAPY | Facility: CLINIC | Age: 27
End: 2022-02-18
Payer: COMMERCIAL

## 2022-02-18 DIAGNOSIS — M65.4 DE QUERVAIN'S DISEASE (TENOSYNOVITIS): ICD-10-CM

## 2022-02-18 PROCEDURE — 97110 THERAPEUTIC EXERCISES: CPT | Mod: GO | Performed by: OCCUPATIONAL THERAPIST

## 2022-02-18 PROCEDURE — 97140 MANUAL THERAPY 1/> REGIONS: CPT | Mod: GO | Performed by: OCCUPATIONAL THERAPIST

## 2022-02-18 NOTE — PROGRESS NOTES
Hand Therapy Progress Note    Current Date:  2/18/2022    Diagnosis: R de Quervain   DOI: 9/1/2021    Reporting period is 11/19/2021 to 2/18/2022    Subjective:   Subjective changes noted by patient:  Patient recently had a repeat first dorsal compartment steroid injection. She notes improved pain, but ongoing stiffness with wrist and thumb motions. She is wearing a thermoplastic elbow flexion block at night. This has been more effective compared to the soft braces. She reports trying to be more mindful of not resting her elbow on the table/desk.  Functional changes noted by patient:  Improvement in Work Tasks, Sleep Patterns and Household Chores  Patient has noted adverse reaction to:  None      Objective:  Pain Level (Scale 0-10):   11/19/2021 2/18/2022   At Rest 4-5/10 2/10   With Use 7/10 Up to 4/10     Pain Description:  Date 11/19/2021 2/18/2022   Location Thumb in 1st dc, shooting pain through thumb and sometimes on pinky side stemming from elbow, thenar muscle is sore.  1st dorsal compartment, hypothenar eminence, medial aspect of elbow   Pain Quality Aching, Burning, Dull, Numb, Sharp, Shooting and Tingling Ache/pulling in 1st dorsal compartment; tingling/numbness of hypothenar eminence   Frequency constant   intermittent   Pain is worst  daytime or nighttime Day or night   Exacerbated by  driving, cooking (gripping and stirring), weightbearing, opening jars, and using keys driving, cooking (gripping and stirring), weightbearing, opening jars, and using keys; sleeping with elbow flexed   Relieved by NSAIDs and CBD oil, otc brace Steroid injection, massage, taping techniques   Progression Gradually worsening Gradually improving     Sensation   Patient endorses ulnar nerve paresthesias. She reports occasional tingling and shooting sensations within the superficial radial nerve distribution.    Edema   - none  + mild    ++ moderate    +++ severe    11/19/2021   1st DC +   Radial Styloid +      ROM  Pain  Report: - none  + mild    ++ moderate    +++ severe   Thumb 11/19/2021 11/19/2021 2/18/2022   AROM (PROM) L R R   MP 0/50 0/36 0/45   IP 0/74 0/77 080   RABD 44 39 51   PABD 41 34 35     Opposition 10 8 NT     Wrist 11/19/2021 11/19/2021 2/18/2022   AROM (PROM) L R R   Extension 64 63 Pre: 63  Post: 70   Flexion 66 51 60   RD 4 3 15   UD 31 24 35  Tightness in 1st DC   UD with Th Flex 23 10 NT     Resisted Testing  Pain Report: - none  + mild    ++ moderate    +++ severe    11/19/2021   APL 4/5, 6/10   EPB 5/5, 6/10   EPL 4+/5, 5/10   FCR 4+/5, 7/10     Strength   (Measured in pounds)  Pain Report: - none  + mild    ++ moderate    +++ severe    11/19/2021 11/19/2021   Trials L R   1  2  3 54 16 (5/10 pain)   Average 54 16     Lat Pinch 11/19/2021 11/19/2021   Trials L R   1  2  3 19 9 (5/10 pain)   Average 19 9     3 Pt Pinch 11/19/2021 11/19/2021   Trials L R   1  2  3 16 7 (5/10 pain)   Average 16 7     Special Tests   Pain Report:  - none    + mild    ++ moderate    +++ severe    11/19/2021 2/18/2022   Finkelstein's + (6/10 pain) +   Radial Nerve Tinel's (DRSN) + (8/10 pain) NT   Th CMC Grind -    Eichoff's test in pronated position  +  Per patient, feels about the same as Finkelstein's test     Neural Tension Testing  RNT: Radial Neurodynamic Test (based on KADIE Higginbotham's ULNT)   11/19/2021   0-5 Scale 1/5   Position:   0/5: Arm across abdomen in coronal plane  1/5: Depress shoulder, ER to neutral ABD shoulder to 45 degrees  2/5: IR shoulder to end range, keep elbow at 90 degrees  3/5: Extend elbow to 0 degrees  4/5: Fully pronate forearm  5/5: Flex wrist and fingers with UD  Notes:    (+) indicates beyond grade level but less than FDC to next level    (-) indicates over FDC to level    S1  onset/change of patient's symptoms    S2 definite stop point based on patient's discomfort level    Neural Tension Testing  UNT: Ulnar Neurodynamic Test (based on KADIE Higginbotham's ULNT)   11/19/2021   0-5 Scale 1/5    Position:   0/5: Arm across abdomen in coronal plane  1/5: ER to neutral ABD shoulder to 45 degrees  2/5: ER shoulder to 90 degrees  3/5: Block shoulder and ABD shoulder to 110 degrees  4/5: Fully pronate forearm, extend wrist and ring and small fingers  5/5: Flex elbow and bring hand to side of face  Notes:    (+) indicates beyond grade level but less than snf to next level    (-) indicates over snf to level    S1  onset/change of patient's symptoms    S2 definite stop point based on patient's discomfort level    Palpation  Pain Report:  - none    + mild    ++ moderate    +++ severe    11/19/2021   Radial Styloid 8/10   1st DC 6/10   FCR 6/10   Thumb CMC 5-6/10   PIN Site 7/10   Extensor Wad 6/10     Please refer to the daily flowsheet for treatment provided today.     Assessment:  Response to therapy has been improvement to:  ROM of Wrist:  All Planes  Thumb:  All Planes  Pain:  frequency is less, intensity of pain is decreased and duration of pain is decreased    Overall Assessment:  Patient's symptoms are resolving.  Patient would benefit from continued therapy to achieve rehab potential  STG/LTG:  STGoals have been reviewed and progress or achievement has occurred;  see goal sheet for details and updates.    Plan:  Frequency/Duration:  Recommend continuing to see patient  2 X a month, once daily  for 3 months  Appropriateness of Rx I have re-evaluated this patient and find that the nature, scope, duration and intensity of the therapy is appropriate for the medical condition of the patient.  Recommendations for Continued Therapy  Additions to Treatment Plan -  Manual Techniques:  Joint mobilization for radial column stiffness    Treatment Plan:  See plan outlined at initial evaluation.    Next Visit:  Joint mobilizations for thumb CMC joint and radial column

## 2022-02-25 ENCOUNTER — THERAPY VISIT (OUTPATIENT)
Dept: OCCUPATIONAL THERAPY | Facility: CLINIC | Age: 27
End: 2022-02-25
Payer: COMMERCIAL

## 2022-02-25 DIAGNOSIS — M65.4 DE QUERVAIN'S DISEASE (TENOSYNOVITIS): ICD-10-CM

## 2022-02-25 PROCEDURE — 97140 MANUAL THERAPY 1/> REGIONS: CPT | Mod: GO | Performed by: OCCUPATIONAL THERAPIST

## 2022-03-22 ENCOUNTER — OFFICE VISIT (OUTPATIENT)
Dept: ORTHOPEDICS | Facility: CLINIC | Age: 27
End: 2022-03-22
Payer: COMMERCIAL

## 2022-03-22 ENCOUNTER — THERAPY VISIT (OUTPATIENT)
Dept: OCCUPATIONAL THERAPY | Facility: CLINIC | Age: 27
End: 2022-03-22
Payer: COMMERCIAL

## 2022-03-22 DIAGNOSIS — M65.4 DE QUERVAIN'S DISEASE (TENOSYNOVITIS): ICD-10-CM

## 2022-03-22 DIAGNOSIS — M65.4 DE QUERVAIN'S DISEASE (TENOSYNOVITIS): Primary | ICD-10-CM

## 2022-03-22 PROCEDURE — 97110 THERAPEUTIC EXERCISES: CPT | Mod: GO | Performed by: OCCUPATIONAL THERAPIST

## 2022-03-22 PROCEDURE — 97140 MANUAL THERAPY 1/> REGIONS: CPT | Mod: GO | Performed by: OCCUPATIONAL THERAPIST

## 2022-03-22 PROCEDURE — 99213 OFFICE O/P EST LOW 20 MIN: CPT | Performed by: STUDENT IN AN ORGANIZED HEALTH CARE EDUCATION/TRAINING PROGRAM

## 2022-03-22 NOTE — PROGRESS NOTES
[facility]    OUTPATIENT Occupational Therapy ORTHOPEDIC EVALUATION  PLAN OF TREATMENT FOR OUTPATIENT REHABILITATION  (COMPLETE FOR INITIAL CLAIMS ONLY)  Patient's Last Name, First Name, M.I.  YOB: 1995  Fatmata Ramirez    Provider s Name:  [facility]   Medical Record No.  [id number]   Start of Care Date:  11/19/21   Onset Date:   09/01/21   Type:     ___PT   _X_OT Medical Diagnosis:    Encounter Diagnosis   Name Primary?     De Quervain's disease (tenosynovitis)         Treatment Diagnosis:  R de Quervain         Goals:     03/22/22 0500   Goal #1   Goal #1 household chores   Previous Performance Level Independent   Current Functional Task    Current Performance Level 7/10 pain   STG Target Perfomance Open a tight or new jar   STG Target Perform Level 5/10 pain   Due Date 03/23/22   Date Goal Met 02/18/22   LTG Target Task/Performance Pain free household chores   Due Date 04/16/22   If goal not met, Why? Progressing, having less pain with household chores. Goal not met.       Therapy Frequency:  2x/month  Predicted Duration of Therapy Intervention:  2 months    Genesis Morales OT                 I CERTIFY THE NEED FOR THESE SERVICES FURNISHED UNDER        THIS PLAN OF TREATMENT AND WHILE UNDER MY CARE     (Physician attestation of this document indicates review and certification of the therapy plan).                       Certification Date From:  02/16/22   Certification Date To:  04/16/22    Referring Provider:  Irving West MD    Initial Assessment        See Epic Evaluation SOC Date: 11/19/21

## 2022-03-22 NOTE — PROGRESS NOTES
Hand Therapy Progress Note    Current Date:  3/22/2022    Diagnosis: Right de Quervain's tenosynovitis   DOI: 9/1/2021  Referring physician: Irving West MD    Reporting period: 2/18/2022 to 3/22/2022    Subjective:  Tingling and numbness of the ring and small fingers is about the same. Patient has not been wearing her cubital tunnel brace at night, but has been trying to keep the arm extended while sleeping. Pain at the first dorsal compartment has improved, noticing more stiffness at this time.  Functional changes noted by patient:  Improvement in Self Care Tasks (dressing, eating, bathing, hygiene/toileting), Recreational Activities and Household Chores  Patient has noted adverse reaction to:  None      Objective:  Pain Level (Scale 0-10):   11/19/2021 2/18/2022 3/22/2022   At Rest 4-5/10 2/10    With Use 7/10 Up to 4/10 2/10     Pain Description:  Date 11/19/2021 2/18/2022   Location Thumb in 1st dc, shooting pain through thumb and sometimes on pinky side stemming from elbow, thenar muscle is sore.  1st dorsal compartment, hypothenar eminence, medial aspect of elbow   Pain Quality Aching, Burning, Dull, Numb, Sharp, Shooting and Tingling Ache/pulling in 1st dorsal compartment; tingling/numbness of hypothenar eminence   Frequency constant   intermittent   Pain is worst  daytime or nighttime Day or night   Exacerbated by  driving, cooking (gripping and stirring), weightbearing, opening jars, and using keys driving, cooking (gripping and stirring), weightbearing, opening jars, and using keys; sleeping with elbow flexed   Relieved by NSAIDs and CBD oil, otc brace Steroid injection, massage, taping techniques   Progression Gradually worsening Gradually improving     Sensation   Patient endorses ulnar nerve paresthesias this visit.    Edema   - none  + mild    ++ moderate    +++ severe    11/19/2021   1st DC +   Radial Styloid +      ROM  Pain Report: - none  + mild    ++ moderate    +++ severe   Thumb 11/19/2021  11/19/2021 2/18/2022 3/22/2022   AROM (PROM) L R R R   MP 0/50 0/36 0/45 0/44   IP 0/74 0/77 0/80 0/76   RABD 44 39 51 55   PABD 41 34 35   45   Opposition 10 8 NT 8     Wrist 11/19/2021 11/19/2021 2/18/2022 3/22/2022   AROM (PROM) L R R R   Extension 64 63 Pre: 63  Post: 70 61   Flexion 66 51 60 65   RD 4 3 15 NT   UD 31 24 35  Tightness in 1st DC 37   UD with Th Flex 23 10 NT 17     Resisted Testing  Pain Report: - none  + mild    ++ moderate    +++ severe    11/19/2021   APL 4/5, 6/10   EPB 5/5, 6/10   EPL 4+/5, 5/10   FCR 4+/5, 7/10     Strength   (Measured in pounds)  Pain Report: - none  + mild    ++ moderate    +++ severe    11/19/2021 11/19/2021 3/22/2022   Trials L R R   1  2  3 54 16 (5/10 pain) 57   Average 54 16 57     Lat Pinch 11/19/2021 11/19/2021 3/22/2022   Trials L R R   1  2  3 19 9 (5/10 pain) 17 (2/10 pain)   Average 19 9 17     3 Pt Pinch 11/19/2021 11/19/2021 3/22/2022   Trials L R R   1  2  3 16 7 (5/10 pain) 15 (2/10 pain)   Average 16 7 15     Special Tests   Pain Report:  - none    + mild    ++ moderate    +++ severe    11/19/2021 2/18/2022 3/22/2022   Finkelstein's + (6/10 pain) + Stretch, but no pain   Radial Nerve Tinel's (DRSN) + (8/10 pain) NT    Th [facility] Grind -     Eichoff's test in pronated position  +  Per patient, feels about the same as Finkelstein's test      Please refer to the daily flowsheet for treatment provided today.     Assessment:  Response to therapy has been improvement to:  ROM of Wrist:  All Planes  Thumb:  All Planes  Strength:   and pinch  Pain:  frequency is less, intensity of pain is decreased and duration of pain is decreased  Response to therapy has been lack of progress in:  Paresthesias:  Ulnar nerve - No change in ulnar nerve paresthesias    Overall Assessment:  Patient's symptoms are resolving.  Patient is progressing well and is ready to decrease frequency of treatment in the clinic.  Patient would benefit from continued therapy to achieve rehab  potential  STG/LTG:  STGoals have been reviewed and progress or achievement has occurred;  see goal sheet for details and updates.    Plan:  Frequency/Duration:  Recommend continuing to see patient  2 X a month, once daily  for 2 months  Appropriateness of Rx I have re-evaluated this patient and find that the nature, scope, duration and intensity of the therapy is appropriate for the medical condition of the patient.    Treatment Plan:  See plan outlined at initial evaluation    Home Exercise Program:  Self wrist and thumb mobilizations  De Quervain's stretch  Self MFR  Ulnar nerve gliding  Elbow flexion block at night    Next Visit:  Review ulnar nerve gliding and cubital tunnel precautions  Taping for cubital tunnel

## 2022-03-22 NOTE — NURSING NOTE
Reason For Visit:   Chief Complaint   Patient presents with     RECHECK     Right de Quervain's tenosynovitis.     Follow Up     Patient had a trial of a repeat steroid injection on 2-8-2022.       Primary MD: Joellen Ref-Primary, Physician  Ref. MD: est     Age: 26 year old    ?  No      There were no vitals taken for this visit.      Pain Assessment  Patient Currently in Pain: Yes  0-10 Pain Scale: 2  Primary Pain Location: Elbow               QuickDASH Assessment  No flowsheet data found.       Current Outpatient Medications   Medication Sig Dispense Refill     cetirizine (ZYRTEC) 10 MG tablet Take 10 mg by mouth daily       escitalopram (LEXAPRO) 10 MG tablet Take 10 mg by mouth daily       fluticasone (FLONASE) 50 MCG/ACT nasal spray        traZODone (DESYREL) 50 MG tablet TAKE 1 TABLET BY MOUTH EVERYDAY AT BEDTIME         Allergies   Allergen Reactions     Pollen Extract Other (See Comments)     Amoxicillin Other (See Comments)     convulsion     Cat Hair Extract Other (See Comments)     Runny nose     Doxycycline Diarrhea and Nausea     Hydrocodone Hives       Cici Carty, ATC

## 2022-03-22 NOTE — LETTER
3/22/2022     RE: Fatmata Ramirez  [address]  [address]  [place] [address]    Dear Colleague,    Thank you for referring your patient, Fatmata Ramirez, to the [facility]. Please see a copy of my visit note below.    Ortho Hand    Right radial sided wrist pain improved since the steroid injection. Has tried right elbow splinting for the last 3-4 weeks consistently with some improvement.     On exam, non-tender over the right radial styloid with less swelling than the last visit and negative Finkelstein's test. Continues to have a mild right elbow Tinel's and mildly positive elbow flexion test.    A/P: 26F p/w R Dequervain's tenosynovitis, R clinically-irritated ulnar nerve at the elbow vs early cubital tunnel syndrome    -Counseled patient to return to clinic for possible additional treatment of recurrent Dequervain's  -Discussed continuing non-operative trial of elbow splinting to see if intermittent ulnar-distributed symptoms improve. If a solid 6-8 week trial of splinting does not improve symptoms, my recommendation is to obtain a nerve conduction study to evaluate for ulnar compressive neuropathy at the elbow. Patient understands and is agreeable.   -Return to clinic in 4-6 weeks  -A total of 10 minutes was devoted to review of chart, direct face-to-face patient counseling and documentation during this encounter    Irving Kelly MD, PhD      Again, thank you for allowing me to participate in the care of your patient.        Sincerely,        Irving Kelly MD

## 2022-03-23 NOTE — PROGRESS NOTES
Ortho Hand    Right radial sided wrist pain improved since the steroid injection. Has tried right elbow splinting for the last 3-4 weeks consistently with some improvement.     On exam, non-tender over the right radial styloid with less swelling than the last visit and negative Finkelstein's test. Continues to have a mild right elbow Tinel's and mildly positive elbow flexion test.    A/P: 26F p/w R Dequervain's tenosynovitis, R clinically-irritated ulnar nerve at the elbow vs early cubital tunnel syndrome    -Counseled patient to return to clinic for possible additional treatment of recurrent Dequervain's  -Discussed continuing non-operative trial of elbow splinting to see if intermittent ulnar-distributed symptoms improve. If a solid 6-8 week trial of splinting does not improve symptoms, my recommendation is to obtain a nerve conduction study to evaluate for ulnar compressive neuropathy at the elbow. Patient understands and is agreeable.   -Return to clinic in 4-6 weeks  -A total of 10 minutes was devoted to review of chart, direct face-to-face patient counseling and documentation during this encounter    Irving Kelly MD, PhD

## 2022-08-03 PROBLEM — M65.4 DE QUERVAIN'S DISEASE (TENOSYNOVITIS): Status: RESOLVED | Noted: 2021-11-19 | Resolved: 2022-08-03

## 2022-09-24 ENCOUNTER — HEALTH MAINTENANCE LETTER (OUTPATIENT)
Age: 27
End: 2022-09-24

## 2022-12-05 ENCOUNTER — OFFICE VISIT (OUTPATIENT)
Dept: OPTOMETRY | Facility: CLINIC | Age: 27
End: 2022-12-05
Payer: COMMERCIAL

## 2022-12-05 DIAGNOSIS — H52.223 REGULAR ASTIGMATISM OF BOTH EYES: Primary | ICD-10-CM

## 2022-12-05 DIAGNOSIS — H53.031 STRABISMIC AMBLYOPIA OF RIGHT EYE: ICD-10-CM

## 2022-12-05 DIAGNOSIS — H33.103 RETINOSCHISIS OF BOTH EYES: ICD-10-CM

## 2022-12-05 ASSESSMENT — REFRACTION
OS_CYLINDER: +1.50
OD_AXIS: 089
OS_CYLINDER: +1.00
OD_SPHERE: -1.75
OS_SPHERE: -0.25
OS_AXIS: 110
OS_SPHERE: +0.50
OS_AXIS: 113
OD_CYLINDER: +2.50

## 2022-12-05 ASSESSMENT — REFRACTION_WEARINGRX
OS_AXIS: 105
OD_AXIS: 078
OD_CYLINDER: +2.75
OS_CYLINDER: +1.75
OS_SPHERE: -0.25
OD_SPHERE: -1.50

## 2022-12-05 ASSESSMENT — TONOMETRY
IOP_METHOD: ICARE
OD_IOP_MMHG: 18
OS_IOP_MMHG: 16

## 2022-12-05 ASSESSMENT — REFRACTION_MANIFEST
OS_AXIS: 105
OS_SPHERE: -0.50
OD_CYLINDER: +3.00
OD_SPHERE: -2.00
OS_CYLINDER: +1.75
OD_AXIS: 080

## 2022-12-05 ASSESSMENT — CONF VISUAL FIELD
OS_NORMAL: 1
OS_SUPERIOR_TEMPORAL_RESTRICTION: 0
OD_SUPERIOR_NASAL_RESTRICTION: 0
OD_SUPERIOR_TEMPORAL_RESTRICTION: 0
OD_INFERIOR_NASAL_RESTRICTION: 0
OD_NORMAL: 1
OS_SUPERIOR_NASAL_RESTRICTION: 0
OS_INFERIOR_TEMPORAL_RESTRICTION: 0
OS_INFERIOR_NASAL_RESTRICTION: 0
OD_INFERIOR_TEMPORAL_RESTRICTION: 0

## 2022-12-05 ASSESSMENT — CUP TO DISC RATIO
OS_RATIO: 0.25
OD_RATIO: 0.25

## 2022-12-05 ASSESSMENT — REFRACTION_CURRENTRX
OS_BRAND: MYDAY TORIC
OS_DIAMETER: 14.5
OD_BRAND: MYDAY TORIC
OD_DIAMETER: 14.5
OD_SPHERE: +1.00
OS_AXIS: 010
OD_BASECURVE: 8.6
OD_CYLINDER: -2.25
OS_CYLINDER: -1.25
OD_AXIS: 170
OS_SPHERE: +1.50
OS_BASECURVE: 8.6

## 2022-12-05 ASSESSMENT — VISUAL ACUITY
OS_CC: 20/25
METHOD: SNELLEN - LINEAR
OD_CC: 20/30-

## 2022-12-05 ASSESSMENT — EXTERNAL EXAM - RIGHT EYE: OD_EXAM: NORMAL

## 2022-12-05 ASSESSMENT — EXTERNAL EXAM - LEFT EYE: OS_EXAM: NORMAL

## 2022-12-05 ASSESSMENT — SLIT LAMP EXAM - LIDS
COMMENTS: NORMAL
COMMENTS: MILD EPIBLEPHARON

## 2022-12-05 NOTE — PATIENT INSTRUCTIONS
Artificial tears: (Water-like consistency. Can be used during the daytime)  -Refresh Plus  -Refresh Optive  -Refresh Relieva  -Systane Ultra  -Systane Complete  -Systane Hydration  -Blink Tears  (Notes: Anything in a bottle has preservatives and can be used up to 4x/day. Preservative free vials can be used as much as necessary)    Gel drops: (Thicker consistency, may blur vision slightly. Can be used during the day or at night)  -Refresh Celluvisc  -Refresh Liquigel  -Refresh Optive Gel Drops  -Systane Gel Drops  -Blink Gel Drops    Ointments: (Very thick, these moisturize the best but can blur vision. Best used right before bedtime)  -Refresh PM  -Systane Nighttime  -Genteal Gel

## 2022-12-05 NOTE — PROGRESS NOTES
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

## 2023-01-29 ENCOUNTER — HEALTH MAINTENANCE LETTER (OUTPATIENT)
Age: 28
End: 2023-01-29

## 2024-02-25 ENCOUNTER — HEALTH MAINTENANCE LETTER (OUTPATIENT)
Age: 29
End: 2024-02-25

## 2025-03-15 ENCOUNTER — HEALTH MAINTENANCE LETTER (OUTPATIENT)
Age: 30
End: 2025-03-15

## (undated) RX ORDER — TRIAMCINOLONE ACETONIDE 40 MG/ML
INJECTION, SUSPENSION INTRA-ARTICULAR; INTRAMUSCULAR
Status: DISPENSED
Start: 2022-02-08

## (undated) RX ORDER — LIDOCAINE HYDROCHLORIDE 10 MG/ML
INJECTION, SOLUTION EPIDURAL; INFILTRATION; INTRACAUDAL; PERINEURAL
Status: DISPENSED
Start: 2022-02-08